# Patient Record
Sex: MALE | Race: OTHER | URBAN - METROPOLITAN AREA
[De-identification: names, ages, dates, MRNs, and addresses within clinical notes are randomized per-mention and may not be internally consistent; named-entity substitution may affect disease eponyms.]

---

## 2023-11-27 ENCOUNTER — OFFICE VISIT (OUTPATIENT)
Dept: PRIMARY CARE | Facility: CLINIC | Age: 73
End: 2023-11-27
Payer: MEDICARE

## 2023-11-27 VITALS
WEIGHT: 108.5 LBS | HEIGHT: 60 IN | OXYGEN SATURATION: 98 % | SYSTOLIC BLOOD PRESSURE: 180 MMHG | BODY MASS INDEX: 21.3 KG/M2 | HEART RATE: 57 BPM | TEMPERATURE: 96.6 F | DIASTOLIC BLOOD PRESSURE: 90 MMHG

## 2023-11-27 DIAGNOSIS — I10 BENIGN ESSENTIAL HYPERTENSION: Primary | ICD-10-CM

## 2023-11-27 DIAGNOSIS — R09.89 LABILE HYPERTENSION: ICD-10-CM

## 2023-11-27 DIAGNOSIS — L50.9 URTICARIA OF UNKNOWN ORIGIN: ICD-10-CM

## 2023-11-27 PROBLEM — K59.03 DRUG-INDUCED CONSTIPATION: Status: ACTIVE | Noted: 2023-11-27

## 2023-11-27 PROBLEM — M54.9 ACUTE BACK PAIN: Status: ACTIVE | Noted: 2023-11-27

## 2023-11-27 PROBLEM — D47.2 MGUS (MONOCLONAL GAMMOPATHY OF UNKNOWN SIGNIFICANCE): Status: ACTIVE | Noted: 2019-12-06

## 2023-11-27 PROBLEM — K59.00 CONSTIPATION: Status: ACTIVE | Noted: 2023-11-27

## 2023-11-27 PROBLEM — T68.XXXA HYPOTHERMIA: Status: ACTIVE | Noted: 2020-12-11

## 2023-11-27 PROBLEM — R53.1 GENERALIZED WEAKNESS: Status: ACTIVE | Noted: 2023-11-27

## 2023-11-27 PROBLEM — E44.0 MODERATE PROTEIN-CALORIE MALNUTRITION (MULTI): Status: ACTIVE | Noted: 2020-12-15

## 2023-11-27 PROBLEM — Z91.148 NONCOMPLIANCE WITH MEDICATION REGIMEN: Status: ACTIVE | Noted: 2020-04-28

## 2023-11-27 PROBLEM — E55.9 VITAMIN D DEFICIENCY: Status: ACTIVE | Noted: 2019-11-11

## 2023-11-27 PROBLEM — K92.2 GI BLEED: Status: ACTIVE | Noted: 2020-12-10

## 2023-11-27 PROBLEM — E87.0 HYPERNATREMIA: Status: ACTIVE | Noted: 2020-12-13

## 2023-11-27 PROBLEM — G93.41 METABOLIC ENCEPHALOPATHY: Status: ACTIVE | Noted: 2020-12-13

## 2023-11-27 PROBLEM — M48.50XA COMPRESSION FRACTURE OF VERTEBRA (MULTI): Status: ACTIVE | Noted: 2023-11-27

## 2023-11-27 PROBLEM — S22.000A COMPRESSION FRACTURE OF THORACIC VERTEBRA (MULTI): Status: ACTIVE | Noted: 2023-11-27

## 2023-11-27 PROBLEM — K85.90 PANCREATITIS (HHS-HCC): Status: ACTIVE | Noted: 2020-12-13

## 2023-11-27 PROBLEM — R41.0 DELIRIUM: Status: ACTIVE | Noted: 2020-12-13

## 2023-11-27 PROBLEM — C90.00 MULTIPLE MYELOMA (MULTI): Status: ACTIVE | Noted: 2023-11-27

## 2023-11-27 PROBLEM — E78.2 MIXED HYPERLIPIDEMIA: Status: ACTIVE | Noted: 2023-11-27

## 2023-11-27 PROBLEM — D64.9 ANEMIA: Status: ACTIVE | Noted: 2020-12-13

## 2023-11-27 PROBLEM — J02.9 ACUTE SORE THROAT: Status: RESOLVED | Noted: 2023-11-27 | Resolved: 2023-11-27

## 2023-11-27 PROBLEM — R10.9 ABDOMINAL PAIN: Status: ACTIVE | Noted: 2020-12-10

## 2023-11-27 PROBLEM — R00.1 SINUS BRADYCARDIA: Status: ACTIVE | Noted: 2020-12-13

## 2023-11-27 PROBLEM — E86.0 DEHYDRATION: Status: ACTIVE | Noted: 2020-12-13

## 2023-11-27 PROBLEM — D68.9 COAGULATION DISORDER (MULTI): Status: ACTIVE | Noted: 2023-11-27

## 2023-11-27 PROBLEM — R53.1 ASTHENIA: Status: ACTIVE | Noted: 2023-11-27

## 2023-11-27 PROBLEM — R11.10 REGURGITATION OF FOOD: Status: ACTIVE | Noted: 2023-11-27

## 2023-11-27 PROBLEM — I67.9 CEREBROVASCULAR SMALL VESSEL DISEASE: Status: ACTIVE | Noted: 2023-11-27

## 2023-11-27 PROBLEM — E29.1 PRIMARY MALE HYPOGONADISM: Status: ACTIVE | Noted: 2019-12-06

## 2023-11-27 PROBLEM — K62.5 RECTAL HEMORRHAGE: Status: ACTIVE | Noted: 2020-12-10

## 2023-11-27 PROBLEM — I67.89 CEREBRAL MICROVASCULAR DISEASE: Status: ACTIVE | Noted: 2023-11-27

## 2023-11-27 PROBLEM — S32.001K: Status: ACTIVE | Noted: 2023-11-27

## 2023-11-27 PROBLEM — S32.040A COMPRESSION FRACTURE OF L4 VERTEBRA (MULTI): Status: ACTIVE | Noted: 2023-11-27

## 2023-11-27 PROBLEM — M81.0 OSTEOPOROSIS: Status: ACTIVE | Noted: 2021-12-23

## 2023-11-27 PROBLEM — D50.9 IRON DEFICIENCY ANEMIA: Status: ACTIVE | Noted: 2020-07-28

## 2023-11-27 PROBLEM — G47.00 INSOMNIA: Status: ACTIVE | Noted: 2023-11-27

## 2023-11-27 PROBLEM — R09.1 PLEURITIS: Status: ACTIVE | Noted: 2023-08-27

## 2023-11-27 PROBLEM — R63.0 POOR APPETITE: Status: ACTIVE | Noted: 2023-11-27

## 2023-11-27 PROBLEM — R13.12 DYSPHAGIA, OROPHARYNGEAL PHASE: Status: ACTIVE | Noted: 2020-10-09

## 2023-11-27 PROBLEM — L30.9 FACIAL DERMATITIS: Status: ACTIVE | Noted: 2023-11-27

## 2023-11-27 PROBLEM — M54.16 LUMBAR RADICULOPATHY: Status: ACTIVE | Noted: 2023-11-27

## 2023-11-27 PROBLEM — J02.9 ACUTE SORE THROAT: Status: ACTIVE | Noted: 2023-11-27

## 2023-11-27 PROBLEM — S52.92XA LEFT FOREARM FRACTURE: Status: ACTIVE | Noted: 2023-11-27

## 2023-11-27 PROBLEM — N17.9 AKI (ACUTE KIDNEY INJURY) (CMS-HCC): Status: ACTIVE | Noted: 2020-12-10

## 2023-11-27 PROBLEM — M81.0 AGE-RELATED OSTEOPOROSIS WITHOUT CURRENT PATHOLOGICAL FRACTURE: Status: ACTIVE | Noted: 2021-12-23

## 2023-11-27 PROBLEM — R79.89 ELEVATED PLASMA METANEPHRINES: Status: ACTIVE | Noted: 2020-06-04

## 2023-11-27 PROBLEM — R74.8 ELEVATED LIPASE: Status: ACTIVE | Noted: 2020-12-10

## 2023-11-27 PROBLEM — E87.1 HYPONATREMIA: Status: ACTIVE | Noted: 2019-12-06

## 2023-11-27 PROCEDURE — 1126F AMNT PAIN NOTED NONE PRSNT: CPT | Performed by: INTERNAL MEDICINE

## 2023-11-27 PROCEDURE — 1036F TOBACCO NON-USER: CPT | Performed by: INTERNAL MEDICINE

## 2023-11-27 PROCEDURE — 1159F MED LIST DOCD IN RCRD: CPT | Performed by: INTERNAL MEDICINE

## 2023-11-27 PROCEDURE — 3080F DIAST BP >= 90 MM HG: CPT | Performed by: INTERNAL MEDICINE

## 2023-11-27 PROCEDURE — 1160F RVW MEDS BY RX/DR IN RCRD: CPT | Performed by: INTERNAL MEDICINE

## 2023-11-27 PROCEDURE — 3077F SYST BP >= 140 MM HG: CPT | Performed by: INTERNAL MEDICINE

## 2023-11-27 PROCEDURE — 99213 OFFICE O/P EST LOW 20 MIN: CPT | Performed by: INTERNAL MEDICINE

## 2023-11-27 RX ORDER — TESTOSTERONE 20.25 MG/1.25G
GEL TOPICAL
COMMUNITY
Start: 2023-03-13 | End: 2024-06-03 | Stop reason: WASHOUT

## 2023-11-27 ASSESSMENT — PATIENT HEALTH QUESTIONNAIRE - PHQ9
1. LITTLE INTEREST OR PLEASURE IN DOING THINGS: NOT AT ALL
2. FEELING DOWN, DEPRESSED OR HOPELESS: NOT AT ALL
SUM OF ALL RESPONSES TO PHQ9 QUESTIONS 1 AND 2: 0

## 2023-11-27 ASSESSMENT — PAIN SCALES - GENERAL: PAINLEVEL: 0-NO PAIN

## 2023-11-27 NOTE — PROGRESS NOTES
"Subjective   Patient ID: Pedro Gregory is a 73 y.o. male who presents for Follow-up.  pneumonia    HPI     Patient is here today with his son.  Since his previous pneumonia, has been following with pulmonologist.  Clinically improved and has had serial CXR.    Blood pressure is labile.  He checks occasionally at home and sometimes it is 120/80.  Has not tolerated blood pressure medications in the past.    Has history hives dating back many years.  Sometimes triggered by food, but it's not consistent as to which foods trigger it.   Sometimes seems unrelated to food.  States he's seen an allergist before and nothing really came of it.  Also has been on several antihistamines without relief.     Review of Systems   Constitutional:  Negative for chills and fever.   Respiratory:  Negative for cough, shortness of breath and wheezing.    Cardiovascular:  Negative for chest pain, palpitations and leg swelling.   Gastrointestinal:  Negative for abdominal pain, constipation, diarrhea and nausea.   Musculoskeletal:  Positive for back pain.   Skin:         Intermittent hives per HPI   Neurological:  Negative for dizziness and light-headedness.       Objective   /90   Pulse 57   Temp 35.9 °C (96.6 °F) (Temporal)   Ht 1.499 m (4' 11\")   Wt 49.2 kg (108 lb 8 oz)   SpO2 98%   BMI 21.91 kg/m²     Physical Exam  Constitutional:       Appearance: Normal appearance.   HENT:      Head: Normocephalic and atraumatic.   Cardiovascular:      Rate and Rhythm: Normal rate and regular rhythm.   Pulmonary:      Effort: Pulmonary effort is normal. No respiratory distress.      Breath sounds: Normal breath sounds. No wheezing.   Musculoskeletal:      Right lower leg: No edema.      Left lower leg: No edema.   Skin:     Findings: No rash.   Neurological:      General: No focal deficit present.      Mental Status: He is alert and oriented to person, place, and time. Mental status is at baseline.   Psychiatric:         Mood and Affect: " Mood normal.         Behavior: Behavior normal.         Thought Content: Thought content normal.         Judgment: Judgment normal.         Assessment/Plan   Problem List Items Addressed This Visit             ICD-10-CM    Benign essential hypertension - Primary I10    Labile hypertension R09.89     Other Visit Diagnoses         Codes    Urticaria of unknown origin     L50.9    Relevant Orders    Referral to Allergy          Labile hypertension - did not do well with anti-hypertensive medication in the past.  No changes at this time.  Can continue intermittent home monitoring.    Intermittent urticaria of unknown origin - last saw allergist > 10 years ago.  Newer medications now such as Xolair.  I recommend that he see allergist again, referral order placed.    Continue routine follow-up with all specialists.     Follow-up 6 months and PRN.

## 2023-11-29 PROBLEM — E86.0 DEHYDRATION: Status: RESOLVED | Noted: 2020-12-13 | Resolved: 2023-11-29

## 2023-11-29 PROBLEM — R74.8 ELEVATED LIPASE: Status: RESOLVED | Noted: 2020-12-10 | Resolved: 2023-11-29

## 2023-11-29 PROBLEM — T68.XXXA HYPOTHERMIA: Status: RESOLVED | Noted: 2020-12-11 | Resolved: 2023-11-29

## 2023-11-29 PROBLEM — K85.90 PANCREATITIS (HHS-HCC): Status: RESOLVED | Noted: 2020-12-13 | Resolved: 2023-11-29

## 2023-11-29 PROBLEM — S52.92XA LEFT FOREARM FRACTURE: Status: RESOLVED | Noted: 2023-11-27 | Resolved: 2023-11-29

## 2023-11-29 PROBLEM — K62.5 RECTAL HEMORRHAGE: Status: RESOLVED | Noted: 2020-12-10 | Resolved: 2023-11-29

## 2023-11-29 PROBLEM — K92.2 GI BLEED: Status: RESOLVED | Noted: 2020-12-10 | Resolved: 2023-11-29

## 2023-11-29 PROBLEM — G93.41 METABOLIC ENCEPHALOPATHY: Status: RESOLVED | Noted: 2020-12-13 | Resolved: 2023-11-29

## 2023-11-29 PROBLEM — M54.9 ACUTE BACK PAIN: Status: RESOLVED | Noted: 2023-11-27 | Resolved: 2023-11-29

## 2023-11-29 PROBLEM — R09.89 LABILE HYPERTENSION: Status: ACTIVE | Noted: 2020-04-28

## 2023-11-29 PROBLEM — R09.1 PLEURITIS: Status: RESOLVED | Noted: 2023-08-27 | Resolved: 2023-11-29

## 2023-11-29 PROBLEM — K59.03 DRUG-INDUCED CONSTIPATION: Status: RESOLVED | Noted: 2023-11-27 | Resolved: 2023-11-29

## 2023-11-29 PROBLEM — E87.0 HYPERNATREMIA: Status: RESOLVED | Noted: 2020-12-13 | Resolved: 2023-11-29

## 2023-11-29 PROBLEM — L30.9 FACIAL DERMATITIS: Status: RESOLVED | Noted: 2023-11-27 | Resolved: 2023-11-29

## 2023-11-29 PROBLEM — R41.0 DELIRIUM: Status: RESOLVED | Noted: 2020-12-13 | Resolved: 2023-11-29

## 2023-11-29 ASSESSMENT — ENCOUNTER SYMPTOMS
PALPITATIONS: 0
SHORTNESS OF BREATH: 0
NAUSEA: 0
COUGH: 0
DIZZINESS: 0
FEVER: 0
ABDOMINAL PAIN: 0
LIGHT-HEADEDNESS: 0
WHEEZING: 0
BACK PAIN: 1
DIARRHEA: 0
CONSTIPATION: 0
CHILLS: 0

## 2023-11-30 ENCOUNTER — OFFICE VISIT (OUTPATIENT)
Dept: PULMONOLOGY | Facility: CLINIC | Age: 73
End: 2023-11-30
Payer: MEDICARE

## 2023-11-30 ENCOUNTER — ANCILLARY PROCEDURE (OUTPATIENT)
Dept: RADIOLOGY | Facility: CLINIC | Age: 73
End: 2023-11-30
Payer: MEDICARE

## 2023-11-30 VITALS
OXYGEN SATURATION: 100 % | SYSTOLIC BLOOD PRESSURE: 121 MMHG | TEMPERATURE: 98.2 F | WEIGHT: 106.4 LBS | BODY MASS INDEX: 21.49 KG/M2 | HEART RATE: 73 BPM | DIASTOLIC BLOOD PRESSURE: 84 MMHG

## 2023-11-30 DIAGNOSIS — J90 PLEURAL EFFUSION DUE TO BACTERIAL INFECTION: ICD-10-CM

## 2023-11-30 DIAGNOSIS — B96.89 PLEURAL EFFUSION DUE TO BACTERIAL INFECTION: ICD-10-CM

## 2023-11-30 DIAGNOSIS — J18.9 PNEUMONIA, UNSPECIFIED ORGANISM: ICD-10-CM

## 2023-11-30 DIAGNOSIS — J18.9 PNEUMONIA OF RIGHT LOWER LOBE DUE TO INFECTIOUS ORGANISM: Primary | ICD-10-CM

## 2023-11-30 PROCEDURE — 3074F SYST BP LT 130 MM HG: CPT | Performed by: INTERNAL MEDICINE

## 2023-11-30 PROCEDURE — 1036F TOBACCO NON-USER: CPT | Performed by: INTERNAL MEDICINE

## 2023-11-30 PROCEDURE — 1159F MED LIST DOCD IN RCRD: CPT | Performed by: INTERNAL MEDICINE

## 2023-11-30 PROCEDURE — 71046 X-RAY EXAM CHEST 2 VIEWS: CPT | Performed by: RADIOLOGY

## 2023-11-30 PROCEDURE — 1126F AMNT PAIN NOTED NONE PRSNT: CPT | Performed by: INTERNAL MEDICINE

## 2023-11-30 PROCEDURE — 1160F RVW MEDS BY RX/DR IN RCRD: CPT | Performed by: INTERNAL MEDICINE

## 2023-11-30 PROCEDURE — 3079F DIAST BP 80-89 MM HG: CPT | Performed by: INTERNAL MEDICINE

## 2023-11-30 PROCEDURE — 71046 X-RAY EXAM CHEST 2 VIEWS: CPT | Mod: FY

## 2023-11-30 PROCEDURE — 99214 OFFICE O/P EST MOD 30 MIN: CPT | Performed by: INTERNAL MEDICINE

## 2023-11-30 RX ORDER — MULTIVIT-MINS/IRON/FOLIC/LYCOP 8-200-600
1 TABLET ORAL DAILY
COMMUNITY
Start: 2019-03-28 | End: 2024-06-03 | Stop reason: WASHOUT

## 2023-11-30 RX ORDER — ACETAMINOPHEN 325 MG/1
650 TABLET ORAL
COMMUNITY
Start: 2023-08-10 | End: 2024-06-03 | Stop reason: WASHOUT

## 2023-11-30 RX ORDER — ALBUTEROL SULFATE 90 UG/1
2 AEROSOL, METERED RESPIRATORY (INHALATION) EVERY 6 HOURS
COMMUNITY
Start: 2023-08-08 | End: 2024-06-03 | Stop reason: WASHOUT

## 2023-11-30 RX ORDER — HYDRALAZINE HYDROCHLORIDE 50 MG/1
1 TABLET, FILM COATED ORAL DAILY
COMMUNITY
Start: 2020-03-23 | End: 2024-06-03 | Stop reason: WASHOUT

## 2023-11-30 RX ORDER — LOSARTAN POTASSIUM 50 MG/1
50 TABLET ORAL
COMMUNITY
Start: 2023-08-17 | End: 2024-06-03 | Stop reason: WASHOUT

## 2023-11-30 RX ORDER — AMLODIPINE BESYLATE 10 MG/1
10 TABLET ORAL
COMMUNITY
Start: 2023-08-17 | End: 2024-06-03 | Stop reason: SINTOL

## 2023-11-30 RX ORDER — ACETAMINOPHEN 500 MG
1 TABLET ORAL DAILY
COMMUNITY
Start: 2019-03-28

## 2023-11-30 NOTE — PROGRESS NOTES
Subjective   Patient ID: Pedroector Jenkins is a 73 y.o. male who presents for Pneumonia and Pain With Breathing.  HPI  Patient was seen today in the office and was accompanied by his son.  He reports that his breathing has been doing very well and he no longer has any pleuritic chest pain.  I reviewed with both of them the results of his current chest x-ray and previous 1 showing that there is at least a 90+ percent resolution in the right-sided infiltrate.  There was also no evidence of a pleural effusion.  Patient denies shortness of breath with activities.  He denies any cough or sputum production and no wheezing.  Review of Systems  Patient denies any fever, chills, rhinitis or sore throat and is not smoking.  All other review of systems were noncontributory.  Refer to [facility]  Objective   Physical Exam  Pulmonary, lungs were clear to auscultation bilaterally.  Cardio, heart sounds were regular rate and rhythm.  Extremities, no cyanosis, clubbing or pretibial edema.  Psych, the patient was alert and oriented x3.  The patient was not in any respiratory distress.  His oxygen saturation today on room air was 100%.  Assessment/Plan        Impressions:  1.  Status post right-sided pneumonia with pleural effusion that has resolved.  Recommendations:  1.  No additional imaging is required.  2.  Patient was given a copy of a prechest x-ray and the current film for comparison.  3.  Patient is not scheduled for any follow-up appointments.      Thank you for allowing me to participate in the care of your patient.  This note was transcribed using the Dragon Dictation system.  There may be grammatical, punctuation, or verbiage errors that occur with voice recognition programs.

## 2023-12-01 ENCOUNTER — APPOINTMENT (OUTPATIENT)
Dept: PULMONOLOGY | Facility: CLINIC | Age: 73
End: 2023-12-01
Payer: MEDICARE

## 2024-06-03 ENCOUNTER — OFFICE VISIT (OUTPATIENT)
Dept: PRIMARY CARE | Facility: CLINIC | Age: 74
End: 2024-06-03
Payer: MEDICARE

## 2024-06-03 VITALS
BODY MASS INDEX: 20.69 KG/M2 | HEART RATE: 63 BPM | DIASTOLIC BLOOD PRESSURE: 108 MMHG | TEMPERATURE: 97 F | OXYGEN SATURATION: 99 % | SYSTOLIC BLOOD PRESSURE: 178 MMHG | HEIGHT: 60 IN | WEIGHT: 105.4 LBS

## 2024-06-03 DIAGNOSIS — Z00.00 MEDICARE ANNUAL WELLNESS VISIT, SUBSEQUENT: Primary | ICD-10-CM

## 2024-06-03 DIAGNOSIS — Z13.89 SCREENING FOR MULTIPLE CONDITIONS: ICD-10-CM

## 2024-06-03 DIAGNOSIS — E44.0 MODERATE PROTEIN-CALORIE MALNUTRITION (MULTI): ICD-10-CM

## 2024-06-03 DIAGNOSIS — R09.89 LABILE HYPERTENSION: ICD-10-CM

## 2024-06-03 DIAGNOSIS — E87.1 HYPONATREMIA: ICD-10-CM

## 2024-06-03 DIAGNOSIS — T46.6X5D ADVERSE EFFECT OF ANTIHYPERLIPIDEMIC DRUG, SUBSEQUENT ENCOUNTER: ICD-10-CM

## 2024-06-03 PROBLEM — Z71.89 CARDIAC RISK COUNSELING: Status: ACTIVE | Noted: 2024-06-03

## 2024-06-03 PROBLEM — T46.6X5A ADVERSE REACTION TO ANTIHYPERLIPIDEMIC DRUG: Status: ACTIVE | Noted: 2024-06-03

## 2024-06-03 PROCEDURE — G0439 PPPS, SUBSEQ VISIT: HCPCS | Performed by: INTERNAL MEDICINE

## 2024-06-03 PROCEDURE — 1159F MED LIST DOCD IN RCRD: CPT | Performed by: INTERNAL MEDICINE

## 2024-06-03 PROCEDURE — 3080F DIAST BP >= 90 MM HG: CPT | Performed by: INTERNAL MEDICINE

## 2024-06-03 PROCEDURE — 1036F TOBACCO NON-USER: CPT | Performed by: INTERNAL MEDICINE

## 2024-06-03 PROCEDURE — 1170F FXNL STATUS ASSESSED: CPT | Performed by: INTERNAL MEDICINE

## 2024-06-03 PROCEDURE — 3077F SYST BP >= 140 MM HG: CPT | Performed by: INTERNAL MEDICINE

## 2024-06-03 PROCEDURE — 1160F RVW MEDS BY RX/DR IN RCRD: CPT | Performed by: INTERNAL MEDICINE

## 2024-06-03 PROCEDURE — 1123F ACP DISCUSS/DSCN MKR DOCD: CPT | Performed by: INTERNAL MEDICINE

## 2024-06-03 PROCEDURE — 1158F ADVNC CARE PLAN TLK DOCD: CPT | Performed by: INTERNAL MEDICINE

## 2024-06-03 PROCEDURE — G0444 DEPRESSION SCREEN ANNUAL: HCPCS | Performed by: INTERNAL MEDICINE

## 2024-06-03 RX ORDER — TESTOSTERONE CYPIONATE 1000 MG/10ML
INJECTION, SOLUTION INTRAMUSCULAR
COMMUNITY

## 2024-06-03 ASSESSMENT — PATIENT HEALTH QUESTIONNAIRE - PHQ9
SUM OF ALL RESPONSES TO PHQ9 QUESTIONS 1 & 2: 0
1. LITTLE INTEREST OR PLEASURE IN DOING THINGS: NOT AT ALL
2. FEELING DOWN, DEPRESSED OR HOPELESS: NOT AT ALL

## 2024-06-03 ASSESSMENT — ACTIVITIES OF DAILY LIVING (ADL)
MANAGING_FINANCES: INDEPENDENT
TAKING_MEDICATION: INDEPENDENT
BATHING: INDEPENDENT
GROCERY_SHOPPING: INDEPENDENT
DRESSING: INDEPENDENT
DOING_HOUSEWORK: INDEPENDENT

## 2024-06-03 NOTE — PROGRESS NOTES
"CHIEF COMPLAINT  Medicare Annual Wellness Visit Subsequent    HISTORY OF PRESENT ILLNESS  Pedro Jenkins is a 74 y.o. male presents today for follow up of Medicare Annual Wellness Visit Subsequent    HPI    Past Medical, Surgical, and Family History reviewed and updated in chart.  Reviewed all medications by prescribing practitioner or clinical pharmacist (such as prescriptions, OTCs, herbal therapies and supplements) and documented in the medical record.    Patient is here today with his son.  Sees heme/onc for MGUS and anemia.   Follows with endocrinologist at [facility] for low testosterone and osteoporosis.     BP is labile, high today.  Has tried several BP meds in past, caused side effects.  He can monitor periodically at home.  Denies any headaches.    Follows well balanced diet.    REVIEW OF SYSTEMS  Review of Systems    ALLERGIES  Amlodipine and Penicillins    MEDICATIONS  Current Outpatient Medications   Medication Instructions    cholecalciferol (Vitamin D-3) 5,000 Units tablet 1 tablet, oral, Daily    denosumab (PROLIA) 60 mg, subcutaneous, Every 6 months    testosterone cypionate (Depo-Testosterone) 100 mg/mL injection intramuscular, Every 14 days       TOBACCO USE  Social History     Tobacco Use   Smoking Status Never   Smokeless Tobacco Never       DEPRESSION SCREEN  Over the past 2 weeks, how often have you been bothered by any of the following problems?  Little interest or pleasure in doing things: Not at all  Feeling down, depressed, or hopeless: Not at all    SURGICAL HISTORY  Past Surgical History:  11/21/2019: OTHER SURGICAL HISTORY      Comment:  Back surgery  02/27/2020: OTHER SURGICAL HISTORY      Comment:  Lower back surgery       OBJECTIVE    BP (!) 178/108   Pulse 63   Temp 36.1 °C (97 °F)   Ht 1.499 m (4' 11\")   Wt 47.8 kg (105 lb 6.4 oz)   SpO2 99%   BMI 21.29 kg/m²    BMI: Estimated body mass index is 21.29 kg/m² as calculated from the following:    Height as of this encounter: 1.499 m (4' " "11\").    Weight as of this encounter: 47.8 kg (105 lb 6.4 oz).    BP Readings from Last 3 Encounters:   06/03/24 (!) 178/108   11/30/23 121/84   11/27/23 180/90      Wt Readings from Last 3 Encounters:   06/03/24 47.8 kg (105 lb 6.4 oz)   11/30/23 48.3 kg (106 lb 6.4 oz)   11/27/23 49.2 kg (108 lb 8 oz)        PHYSICAL EXAM  Physical Exam  Constitutional:       Comments: Frail appearing   HENT:      Head: Normocephalic and atraumatic.      Ears:      Comments: Wears hearing aids  Cardiovascular:      Rate and Rhythm: Normal rate and regular rhythm.      Pulses: Normal pulses.      Heart sounds: No murmur heard.  Pulmonary:      Effort: Pulmonary effort is normal. No respiratory distress.      Breath sounds: Normal breath sounds. No wheezing.   Abdominal:      General: Abdomen is flat. There is no distension.      Palpations: Abdomen is soft.   Musculoskeletal:      Right lower leg: No edema.      Left lower leg: No edema.   Skin:     Findings: No rash.   Neurological:      General: No focal deficit present.      Mental Status: He is alert and oriented to person, place, and time.   Psychiatric:         Mood and Affect: Mood normal.         Behavior: Behavior normal.          ASSESSMENT AND PLAN  Assessment/Plan   Problem List Items Addressed This Visit       Hyponatremia    Overview     -Chronic, has seen nephrologist for this in past.    -usually 127-133         Labile hypertension    Moderate protein-calorie malnutrition (Multi)    Overview     Well balanced diet recommended         Medicare annual wellness visit, subsequent - Primary    Screening for multiple conditions    Overview     Depression screening completed using the PHQ2 questions with results documented in the chart/encounter.  *5 minutes spent on annual depression screening         Adverse reaction to antihyperlipidemic drug     Medicare Wellness Visit completed.    Hypertension, labile - has tried medication in past, caused side effects.  He declines " additional treatment.  Advised to monitor at home.    Labs reviewed from [facility] including recent CMP and CBC.  Declines lipid check.  Had tried statin in past, caused side effects and he is not interested in further evaluation / treatment.    Heart healthy diet recommended.    No additional prostate and colon cancer screening on basis of age.      Reviewed signs of skin cancer and importance of sun protection.    Advised to stay up to date with routine dental and eye exams.    Prevnar 20 and Shingrix recommended.    Continue routine follow-up with all specialists.      Follow-up annually.

## 2025-06-09 ENCOUNTER — APPOINTMENT (OUTPATIENT)
Dept: PRIMARY CARE | Facility: CLINIC | Age: 75
End: 2025-06-09
Payer: MEDICARE

## 2025-06-30 ENCOUNTER — APPOINTMENT (OUTPATIENT)
Dept: PRIMARY CARE | Facility: CLINIC | Age: 75
End: 2025-06-30
Payer: MEDICARE

## 2025-06-30 VITALS
HEART RATE: 64 BPM | HEIGHT: 60 IN | SYSTOLIC BLOOD PRESSURE: 172 MMHG | DIASTOLIC BLOOD PRESSURE: 90 MMHG | TEMPERATURE: 97 F | OXYGEN SATURATION: 100 % | BODY MASS INDEX: 21.62 KG/M2 | WEIGHT: 110.1 LBS

## 2025-06-30 DIAGNOSIS — I10 BENIGN ESSENTIAL HYPERTENSION: ICD-10-CM

## 2025-06-30 DIAGNOSIS — D47.2 MGUS (MONOCLONAL GAMMOPATHY OF UNKNOWN SIGNIFICANCE): ICD-10-CM

## 2025-06-30 DIAGNOSIS — Z78.9 PATIENT HAS HEALTHCARE PROXY AND LIVING WILL: ICD-10-CM

## 2025-06-30 DIAGNOSIS — E29.1 PRIMARY MALE HYPOGONADISM: ICD-10-CM

## 2025-06-30 DIAGNOSIS — Z87.81 HISTORY OF VERTEBRAL COMPRESSION FRACTURE: ICD-10-CM

## 2025-06-30 DIAGNOSIS — R09.89 LABILE HYPERTENSION: ICD-10-CM

## 2025-06-30 DIAGNOSIS — M81.0 AGE RELATED OSTEOPOROSIS, UNSPECIFIED PATHOLOGICAL FRACTURE PRESENCE: ICD-10-CM

## 2025-06-30 DIAGNOSIS — Z13.89 SCREENING FOR MULTIPLE CONDITIONS: ICD-10-CM

## 2025-06-30 DIAGNOSIS — Z00.00 MEDICARE ANNUAL WELLNESS VISIT, SUBSEQUENT: Primary | ICD-10-CM

## 2025-06-30 PROBLEM — S32.040A COMPRESSION FRACTURE OF L4 VERTEBRA (MULTI): Status: RESOLVED | Noted: 2023-11-27 | Resolved: 2025-06-30

## 2025-06-30 PROBLEM — S32.001K: Status: RESOLVED | Noted: 2023-11-27 | Resolved: 2025-06-30

## 2025-06-30 PROBLEM — S22.000A COMPRESSION FRACTURE OF THORACIC VERTEBRA (MULTI): Status: RESOLVED | Noted: 2023-11-27 | Resolved: 2025-06-30

## 2025-06-30 PROBLEM — M48.50XA COMPRESSION FRACTURE OF VERTEBRA (MULTI): Status: RESOLVED | Noted: 2023-11-27 | Resolved: 2025-06-30

## 2025-06-30 PROCEDURE — G0439 PPPS, SUBSEQ VISIT: HCPCS | Performed by: INTERNAL MEDICINE

## 2025-06-30 PROCEDURE — 1170F FXNL STATUS ASSESSED: CPT | Performed by: INTERNAL MEDICINE

## 2025-06-30 PROCEDURE — 1123F ACP DISCUSS/DSCN MKR DOCD: CPT | Performed by: INTERNAL MEDICINE

## 2025-06-30 PROCEDURE — 1036F TOBACCO NON-USER: CPT | Performed by: INTERNAL MEDICINE

## 2025-06-30 PROCEDURE — 1160F RVW MEDS BY RX/DR IN RCRD: CPT | Performed by: INTERNAL MEDICINE

## 2025-06-30 PROCEDURE — G0444 DEPRESSION SCREEN ANNUAL: HCPCS | Performed by: INTERNAL MEDICINE

## 2025-06-30 PROCEDURE — 1159F MED LIST DOCD IN RCRD: CPT | Performed by: INTERNAL MEDICINE

## 2025-06-30 PROCEDURE — 3077F SYST BP >= 140 MM HG: CPT | Performed by: INTERNAL MEDICINE

## 2025-06-30 PROCEDURE — 3080F DIAST BP >= 90 MM HG: CPT | Performed by: INTERNAL MEDICINE

## 2025-06-30 ASSESSMENT — ENCOUNTER SYMPTOMS
BACK PAIN: 1
NERVOUS/ANXIOUS: 0
ARTHRALGIAS: 1
COUGH: 0
PALPITATIONS: 0
HEADACHES: 0
FEVER: 0
CHILLS: 0
DYSPHORIC MOOD: 0

## 2025-06-30 ASSESSMENT — ACTIVITIES OF DAILY LIVING (ADL)
BATHING: INDEPENDENT
GROCERY_SHOPPING: INDEPENDENT
TAKING_MEDICATION: INDEPENDENT
DRESSING: INDEPENDENT
MANAGING_FINANCES: INDEPENDENT
DOING_HOUSEWORK: INDEPENDENT

## 2025-06-30 ASSESSMENT — PATIENT HEALTH QUESTIONNAIRE - PHQ9
SUM OF ALL RESPONSES TO PHQ9 QUESTIONS 1 AND 2: 0
2. FEELING DOWN, DEPRESSED OR HOPELESS: NOT AT ALL
1. LITTLE INTEREST OR PLEASURE IN DOING THINGS: NOT AT ALL

## 2025-06-30 NOTE — PROGRESS NOTES
CHIEF COMPLAINT  Medicare Annual Wellness Visit Subsequent    HISTORY OF PRESENT ILLNESS  Pedro Jenkins is a 75 y.o. male presents today for follow up of Medicare Annual Wellness Visit Subsequent    HPI    Past Medical, Surgical, and Family History reviewed and updated in chart.  Reviewed all medications by prescribing practitioner or clinical pharmacist (such as prescriptions, OTCs, herbal therapies and supplements) and documented in the medical record.    Patient is here today with his son.  Follows with endocrinologist and heme/onc at [facility].  Recent labs reviewed from [facility].  Tends to get rashes after eating, sensitive to many foods, but no specific food allergies reported.  When he last saw allergist, biopsy of rash was suggested, patient declined.   Side effects with several BP meds in the past.  States BP is normal after he eats and he prefers to control BP at home.     REVIEW OF SYSTEMS  Review of Systems   Constitutional:  Negative for chills and fever.   Respiratory:  Negative for cough.    Cardiovascular:  Negative for chest pain, palpitations and leg swelling.   Musculoskeletal:  Positive for arthralgias and back pain.   Skin:  Positive for rash.   Neurological:  Negative for headaches.   Psychiatric/Behavioral:  Negative for dysphoric mood. The patient is not nervous/anxious.        ALLERGIES  Amlodipine and Penicillins    MEDICATIONS  Current Outpatient Medications   Medication Instructions    cholecalciferol (Vitamin D-3) 5,000 Units tablet 1 tablet, Daily    denosumab (PROLIA) 60 mg, Every 6 months    testosterone cypionate (Depo-Testosterone) 100 mg/mL injection Every 14 days       TOBACCO USE  Tobacco Use History[1]    DEPRESSION SCREEN  Over the past 2 weeks, how often have you been bothered by any of the following problems?  Little interest or pleasure in doing things: Not at all  Feeling down, depressed, or hopeless: Not at all    SURGICAL HISTORY  Past Surgical History:  11/21/2019: OTHER SURGICAL  "HISTORY      Comment:  Back surgery  02/27/2020: OTHER SURGICAL HISTORY      Comment:  Lower back surgery       OBJECTIVE    /90   Pulse 64   Temp 36.1 °C (97 °F)   Ht (!) 1.499 m (4' 11\")   Wt 49.9 kg (110 lb 1.6 oz)   SpO2 100%   BMI 22.24 kg/m²    BMI: Estimated body mass index is 22.24 kg/m² as calculated from the following:    Height as of this encounter: 1.499 m (4' 11\").    Weight as of this encounter: 49.9 kg (110 lb 1.6 oz).    BP Readings from Last 3 Encounters:   06/30/25 172/90   06/03/24 (!) 178/108   11/30/23 121/84      Wt Readings from Last 3 Encounters:   06/30/25 49.9 kg (110 lb 1.6 oz)   06/03/24 47.8 kg (105 lb 6.4 oz)   11/30/23 48.3 kg (106 lb 6.4 oz)        PHYSICAL EXAM  Physical Exam  Constitutional:       Comments: Frail appearing   HENT:      Head: Normocephalic and atraumatic.      Right Ear: Tympanic membrane and ear canal normal.      Left Ear: Tympanic membrane and ear canal normal.      Ears:      Comments: Wears hearing aids  Neck:      Vascular: No carotid bruit.   Cardiovascular:      Rate and Rhythm: Normal rate and regular rhythm.      Pulses: Normal pulses.      Heart sounds: No murmur heard.  Pulmonary:      Effort: Pulmonary effort is normal. No respiratory distress.      Breath sounds: Normal breath sounds. No wheezing.   Musculoskeletal:      Right lower leg: No edema.      Left lower leg: No edema.   Skin:     Comments: A few small scattered pink papules on left lateral cheek, not frankly urticarial   Neurological:      General: No focal deficit present.      Mental Status: He is alert and oriented to person, place, and time.   Psychiatric:         Mood and Affect: Mood normal.         Behavior: Behavior normal.          ASSESSMENT AND PLAN  Assessment/Plan   Problem List Items Addressed This Visit       Benign essential hypertension    Labile hypertension    MGUS (monoclonal gammopathy of unknown significance)    Overview   Follows with oncologist at [facility]      "    Osteoporosis    Overview   Managed by endocrinologist         Primary male hypogonadism    Overview   Follows with endocrinologist         Medicare annual wellness visit, subsequent - Primary    Screening for multiple conditions    Overview   5 minutes were spent screening for depression using PHQ2/PHQ9 as documented in the chart.           Patient has healthcare proxy and living will    History of vertebral compression fracture       Medicare Wellness Visit completed.    Hypertension, labile - has tried medication in past, caused side effects.  He declines additional treatment.   Continue to monitor at home.     Labs reviewed from [facility] including recent CMP and CBC.       Well balanced, heart healthy diet recommended.  Consider seeing dietician.     PSA 6/9/25.    Declines colon cancer screening.      Reviewed signs of skin cancer and importance of sun protection.     Advised to stay up to date with routine dental and eye exams.     Prevnar 20 and Shingrix recommended.  - patient is not interested in immunizations.      Continue routine follow-up with all specialists.      Has Living Will and Healthcare POA.     Follow-up annually.          [1]   Social History  Tobacco Use   Smoking Status Never   Smokeless Tobacco Never

## 2026-07-06 ENCOUNTER — APPOINTMENT (OUTPATIENT)
Dept: PRIMARY CARE | Facility: CLINIC | Age: 76
End: 2026-07-06
Payer: MEDICARE